# Patient Record
Sex: FEMALE | Race: WHITE | NOT HISPANIC OR LATINO | Employment: OTHER | URBAN - METROPOLITAN AREA
[De-identification: names, ages, dates, MRNs, and addresses within clinical notes are randomized per-mention and may not be internally consistent; named-entity substitution may affect disease eponyms.]

---

## 2017-01-16 PROBLEM — S82.142A TIBIAL PLATEAU FRACTURE, LEFT: Status: ACTIVE | Noted: 2017-01-16

## 2017-01-16 PROBLEM — I48.91 ATRIAL FIBRILLATION: Status: ACTIVE | Noted: 2017-01-16

## 2017-01-18 PROBLEM — G93.40 ACUTE ENCEPHALOPATHY: Status: ACTIVE | Noted: 2017-01-18

## 2017-01-18 PROBLEM — J96.11 CHRONIC RESPIRATORY FAILURE WITH HYPOXIA: Status: ACTIVE | Noted: 2017-01-18

## 2017-03-15 PROBLEM — J18.9 PNEUMONIA OF LEFT LOWER LOBE DUE TO INFECTIOUS ORGANISM: Status: ACTIVE | Noted: 2017-03-15

## 2017-03-15 PROBLEM — R04.2 HEMOPTYSIS: Status: ACTIVE | Noted: 2017-03-15

## 2017-03-15 PROBLEM — D64.9 ANEMIA: Status: ACTIVE | Noted: 2017-03-15

## 2017-03-15 PROBLEM — J18.9 PNEUMONIA: Status: ACTIVE | Noted: 2017-03-15

## 2017-03-15 PROBLEM — R74.01 TRANSAMINITIS: Status: ACTIVE | Noted: 2017-03-15

## 2017-03-15 PROBLEM — T45.511A COUMADIN TOXICITY: Status: ACTIVE | Noted: 2017-03-15

## 2017-06-12 PROBLEM — I48.19 PERSISTENT ATRIAL FIBRILLATION: Status: ACTIVE | Noted: 2017-06-12

## 2017-09-28 PROBLEM — I48.0 PAROXYSMAL ATRIAL FIBRILLATION: Status: ACTIVE | Noted: 2017-09-28

## 2018-02-15 PROBLEM — I35.0 SEVERE AORTIC STENOSIS: Status: ACTIVE | Noted: 2018-02-15

## 2018-03-07 PROBLEM — M54.31 BILATERAL SCIATICA: Status: ACTIVE | Noted: 2018-03-07

## 2018-03-07 PROBLEM — M48.02 CERVICAL SPINAL STENOSIS: Status: ACTIVE | Noted: 2018-03-07

## 2018-03-07 PROBLEM — M54.32 BILATERAL SCIATICA: Status: ACTIVE | Noted: 2018-03-07

## 2018-03-13 ENCOUNTER — OFFICE VISIT (OUTPATIENT)
Dept: VASCULAR SURGERY | Facility: CLINIC | Age: 68
End: 2018-03-13
Payer: MEDICARE

## 2018-03-13 VITALS
HEIGHT: 63 IN | WEIGHT: 192.69 LBS | BODY MASS INDEX: 34.14 KG/M2 | SYSTOLIC BLOOD PRESSURE: 142 MMHG | DIASTOLIC BLOOD PRESSURE: 74 MMHG | HEART RATE: 73 BPM

## 2018-03-13 DIAGNOSIS — Z79.01 WARFARIN ANTICOAGULATION: ICD-10-CM

## 2018-03-13 DIAGNOSIS — I10 ESSENTIAL HYPERTENSION: ICD-10-CM

## 2018-03-13 DIAGNOSIS — I48.19 PERSISTENT ATRIAL FIBRILLATION: ICD-10-CM

## 2018-03-13 DIAGNOSIS — M79.7 FIBROMYALGIA: ICD-10-CM

## 2018-03-13 DIAGNOSIS — I35.0 AORTIC VALVE STENOSIS, NONRHEUMATIC: ICD-10-CM

## 2018-03-13 DIAGNOSIS — I35.0 SEVERE AORTIC STENOSIS: Primary | ICD-10-CM

## 2018-03-13 PROCEDURE — 99999 PR PBB SHADOW E&M-EST. PATIENT-LVL III: CPT | Mod: PBBFAC,,, | Performed by: THORACIC SURGERY (CARDIOTHORACIC VASCULAR SURGERY)

## 2018-03-13 PROCEDURE — 99213 OFFICE O/P EST LOW 20 MIN: CPT | Mod: PBBFAC,PO | Performed by: THORACIC SURGERY (CARDIOTHORACIC VASCULAR SURGERY)

## 2018-03-13 PROCEDURE — 99205 OFFICE O/P NEW HI 60 MIN: CPT | Mod: S$PBB,,, | Performed by: THORACIC SURGERY (CARDIOTHORACIC VASCULAR SURGERY)

## 2018-03-15 PROBLEM — I35.0 AORTIC VALVE STENOSIS, NONRHEUMATIC: Status: ACTIVE | Noted: 2018-03-15

## 2018-03-15 PROBLEM — Z79.01 WARFARIN ANTICOAGULATION: Status: ACTIVE | Noted: 2018-03-15

## 2018-03-15 NOTE — PROGRESS NOTES
CHIEF COMPLAINT:   Chief Complaint   Patient presents with    Aortic Stenosis     Stahls/aortic valve stenosis       REFERRING PROVIDER: Self, Aaareferral    Reason for consult: Evaluation of aortic valve disease    Subjective:     Patient is a 68 y.o. female who has had progressive shortness of breath. Evaluation by cardiology has revealed moderate to severe aortic valve stenosis. She feels that her chronic shortness of breath has worsened. She has a history of COPD. She has not had recent pulmonary function studies. She also has frequent palpitations which she feels are likely related to her atrial fibrillation. She denies syncope.    Patient Active Problem List    Diagnosis Date Noted    Bilateral sciatica 03/07/2018    Cervical spinal stenosis 03/07/2018    Severe aortic stenosis 02/15/2018    Paroxysmal atrial fibrillation 09/28/2017    Persistent atrial fibrillation 06/12/2017    Pneumonia 03/15/2017    Hemoptysis 03/15/2017    Coumadin toxicity 03/15/2017    Pneumonia of left lower lobe due to infectious organism 03/15/2017    Transaminitis 03/15/2017    Anemia 03/15/2017    Acute encephalopathy 01/18/2017    Chronic respiratory failure with hypoxia 01/18/2017    Tibial plateau fracture, left 01/16/2017    Atrial fibrillation 01/16/2017    Bronchitis with bronchospasm 12/07/2016    Asthma 08/26/2016    COPD (chronic obstructive pulmonary disease) 08/26/2016    Acquired hypothyroidism 08/26/2016    Anxiety and depression 08/26/2016    Headache 08/26/2016    Essential hypertension 06/06/2016    Hypothyroidism 06/06/2016    Gastroesophageal reflux disease without esophagitis 06/06/2016    Pulmonary embolism 06/06/2016    Osteoporosis 06/06/2016    Fibromyalgia      Past Medical History:   Diagnosis Date    Allergy     Anticoagulant long-term use     Aortic stenosis     Arthritis     Asthma     Atrial fibrillation     Back pain     Cardiac murmur     COPD (chronic  obstructive pulmonary disease)     Encounter for blood transfusion     Fibromyalgia     General anesthetics causing adverse effect in therapeutic use     GERD (gastroesophageal reflux disease)     [place] (hard of hearing)     Hypertension     Hypothyroidism     Osteoporosis     PUD (peptic ulcer disease)     Pulmonary embolism     Thoracic vertebral collapse     Uses walker       Past Surgical History:   Procedure Laterality Date    APPENDECTOMY      BLADDER SUSPENSION      CARDIAC CATHETERIZATION      COLON SURGERY      COLONOSCOPY      HYSTERECTOMY      LEG SURGERY Left     PELVIC FLOOR REPAIR      TONSILLECTOMY        Medication List with Changes/Refills   New Medications    DOXYCYCLINE (ADOXA) 50 MG TABLET    Take 1 tablet (50 mg total) by mouth 2 (two) times daily.   Current Medications    ALBUTEROL-IPRATROPIUM 2.5MG-0.5MG/3ML (DUO-NEB) 0.5 MG-3 MG(2.5 MG BASE)/3 ML NEBULIZER SOLUTION    Take 3 mLs by nebulization every 6 (six) hours as needed for Wheezing. Rescue    ALENDRONATE (FOSAMAX) 70 MG TABLET    every sunday TAKE 1 TABLET 30 MIN BEFORE BREAKFAST WITH WATER, DON'T LIE DOWN OR EAT FOR 30 MIN.    BUSPIRONE (BUSPAR) 10 MG TABLET    Take 10 mg by mouth 2 (two) times daily.    DILTIAZEM (TIAZAC) 360 MG CS24    Take 1 capsule (360 mg total) by mouth once daily. TAKE 1 CAPSULE DAILY *THANK YOU* *YOEL ARTEAGA*    DULOXETINE (CYMBALTA) 30 MG CAPSULE    TAKE 1 CAPSULE BY MOUTH TWICE DAILY    FLUTICASONE-VILANTEROL (BREO ELLIPTA) 200-25 MCG/DOSE DSDV DISKUS INHALER    Inhale 1 puff into the lungs once daily. Controller    FUROSEMIDE (LASIX) 20 MG TABLET    TAKE 1 TABLET EVERY MORNING *THANK YOU* *YOEL ARTEAGA*    LEVOTHYROXINE (SYNTHROID) 75 MCG TABLET    Take 1 tablet (75 mcg total) by mouth once daily.    LISINOPRIL 10 MG TABLET    Take 1 tablet (10 mg total) by mouth once daily. TAKE 1 TABLET EVERY MORNING THANK YOU YOEL ARTEAGA    OXYCODONE-ACETAMINOPHEN (PERCOCET)  MG PER TABLET    Take 1  tablet by mouth 4 (four) times daily as needed for Pain.    RANITIDINE (ZANTAC) 150 MG TABLET    TAKE 1 TABLET TWICE DAILY *THANK YOU* *GOD BLESS*    ROSUVASTATIN (CRESTOR) 10 MG TABLET    TAKE ONE TABLET DAILY AT BEDTIME *THANK YOU* *GOD BLESS*    TIZANIDINE (ZANAFLEX) 4 MG TABLET    TAKE (1) TABLET EVERY EIGHT HOURS AS NEEDED FOR MUSCLE SPASMS    VENTOLIN HFA 90 MCG/ACTUATION INHALER    INHALE 2 PUFFS EVERY 4 HOURS AS NEEDED *THANK YOU* *GOD BLESS*    VITAMIN D2 50,000 UNIT CAPSULE    TAKE 1 CAPSULE once A month    WARFARIN (COUMADIN) 10 MG TABLET    TAKE ONE TABLET DAILY 5 TIMES A WEEK AND TAKE 1 & 1/2 TABLET 2 DAYS *THANK YOU* *GOD BLESS*   Discontinued Medications    DULOXETINE (CYMBALTA) 30 MG CAPSULE    Take 30 mg by mouth 2 (two) times daily.    DULOXETINE (CYMBALTA) 30 MG CAPSULE    TAKE 1 CAPSULE BY MOUTH TWICE DAILY    FLUTICASONE (FLONASE) 50 MCG/ACTUATION NASAL SPRAY    1 spray by Each Nare route daily as needed.     FUROSEMIDE (LASIX) 20 MG TABLET    Take 2 tablets (40 mg total) by mouth once daily.    MONTELUKAST (SINGULAIR) 10 MG TABLET    Take 1 tablet (10 mg total) by mouth every morning.    RANITIDINE (ZANTAC) 150 MG TABLET    Take 1 tablet (150 mg total) by mouth 2 (two) times daily. TAKE 1 TABLET TWICE DAILY *THANK YOU* *YOEL ANDRADESS*    RANITIDINE (ZANTAC) 150 MG TABLET    TAKE 1 TABLET TWICE DAILY *THANK YOU* *YOEL ANDRADESS*    WARFARIN (COUMADIN) 10 MG TABLET    Take 7 mg by mouth once daily.      Review of patient's allergies indicates:   Allergen Reactions    Keflex [cephalexin]     Penicillins     Sulfa (sulfonamide antibiotics)       Social History   Substance Use Topics    Smoking status: Former Smoker     Packs/day: 1.00     Years: 20.00     Types: Cigarettes     Quit date: 1982    Smokeless tobacco: Never Used    Alcohol use No      Family History   Problem Relation Age of Onset    Heart disease Mother     Diabetes Mother     Diabetes Father     Hypertension Father     Arthritis  "Sister     Myasthenia gravis Sister     Heart disease Brother       Review of Systems  Gen.: No fevers, chills, night sweats. She has had weight change and progressive fatigue.  HEENT: Chronic visual field changes. Chronic diminished hearing. No hoarseness.  Neck: No complaints.  Cardiac: Positive palpitations and worsening shortness of breath with exertion. No specific chest pain.  Respiratory: Chronic shortness of breath which is worsening currently.  GI: She has swallowing problems intermittently. Positive constipation.  : No complaints.  Musculoskeletal: Chronic weakness and pains in legs especially the left knee which will require replacement at some time soon.  Vascular: No claudication.  Neurologic: No lateralizing complaints.  Hematologic: Easy bleeding with current use of warfarin for her atrial fibrillation (chronic).    Objective: Physical exam      Vitals:    03/13/18 1342   BP: (!) 142/74   Pulse: 73   Weight: 87.4 kg (192 lb 10.9 oz)   Height: 5' 3" (1.6 m)   PainSc:   6   PainLoc: Knee       Gen.: No obvious distress.  HEENT: Normocephalic, atraumatic. There is good facial symmetry.  Neck: Trachea is midline. There are no carotid bruits.  Chest: No chest wall abnormalities.  Lungs: Clear bilaterally with slightly prolonged expiratory phase.  Heart: Irregularly irregular with normal rate.  Abdomen: Organomegaly.  Extremities: Trace edema at the ankles.  Skin: No rash.  Neurologic: No lateralizing motor abnormalities.  Vascular: 2+ radial pulses bilaterally.    Data Review:   Lab Results   Component Value Date    WBC 9.92 11/28/2017    HGB 13.4 11/28/2017    HCT 43.8 11/28/2017    MCV 94 11/28/2017     11/28/2017   ,   BMP   Lab Results   Component Value Date     11/28/2017    K 4.0 11/28/2017    CL 99 11/28/2017    CO2 33 (H) 11/28/2017    BUN 26 (H) 11/28/2017    CREATININE 1.02 11/28/2017    CALCIUM 9.4 11/28/2017    ANIONGAP 12 11/28/2017    ESTGFRAFRICA >60 11/28/2017    EGFRNONAA " 57 (A) 11/28/2017   ,   CMP  Sodium   Date Value Ref Range Status   11/28/2017 144 136 - 145 mmol/L Final     Potassium   Date Value Ref Range Status   11/28/2017 4.0 3.5 - 5.1 mmol/L Final     Chloride   Date Value Ref Range Status   11/28/2017 99 95 - 110 mmol/L Final     CO2   Date Value Ref Range Status   11/28/2017 33 (H) 22 - 31 mmol/L Final     Glucose   Date Value Ref Range Status   11/28/2017 83 70 - 110 mg/dL Final     Comment:     The ADA recommends the following guidelines for fasting glucose:  Normal:       less than 100 mg/dL  Prediabetes:  100 mg/dL to 125 mg/dL  Diabetes:     126 mg/dL or higher       BUN, Bld   Date Value Ref Range Status   11/28/2017 26 (H) 7 - 18 mg/dL Final     Creatinine   Date Value Ref Range Status   11/28/2017 1.02 0.50 - 1.40 mg/dL Final     Calcium   Date Value Ref Range Status   11/28/2017 9.4 8.4 - 10.2 mg/dL Final     Total Protein   Date Value Ref Range Status   09/28/2017 7.7 6.0 - 8.4 g/dL Final     Albumin   Date Value Ref Range Status   09/28/2017 4.1 3.5 - 5.2 g/dL Final     Total Bilirubin   Date Value Ref Range Status   09/28/2017 0.4 0.2 - 1.3 mg/dL Final     Alkaline Phosphatase   Date Value Ref Range Status   09/28/2017 86 38 - 145 U/L Final     AST ([place])   Date Value Ref Range Status   02/04/2016 30 14 - 36 U/L Final     AST   Date Value Ref Range Status   09/28/2017 22 14 - 36 U/L Final     ALT   Date Value Ref Range Status   09/28/2017 27 10 - 44 U/L Final     Anion Gap   Date Value Ref Range Status   11/28/2017 12 8 - 16 mmol/L Final     eGFR if    Date Value Ref Range Status   11/28/2017 >60 >60 mL/min/1.73 m^2 Final     eGFR if non    Date Value Ref Range Status   11/28/2017 57 (A) >60 mL/min/1.73 m^2 Final     Comment:     Calculation used to obtain the estimated glomerular filtration  rate (eGFR) is the CKD-EPI equation.      ,   Lab Results   Component Value Date    INR 4.9 (A) 03/07/2018    INR 1.6  02/15/2018    INR 1.8 (A) 01/05/2018       ECG:No results found for this visit on 03/13/18.    2D ECHO:   Date of Procedure: 01/08/2018  TEST DESCRIPTION   Aorta: The aortic root is normal in size, measuring 2.6 cm at sinotubular junction. The proximal ascending aorta is normal in size, measuring 2.4 cm across.   Left Atrium: The left atrial volume index is severely enlarged, measuring 58.71 cc/m2.   Left Ventricle: The left ventricle is normal in size, with an end-diastolic diameter of 4.7 cm, and an end-systolic diameter of 3.4 cm. LV wall thickness is normal, with the septum and the posterior wall each measuring 1.1 cm across. Relative wall   thickness was increased at 0.47, and the LV mass index was increased at 115.8 g/m2 consistent with concentric left ventricular hypertrophy. There are no regional wall motion abnormalities. Left ventricular systolic function appears normal. Visually   estimated ejection fraction is 60-65%. The LV Doppler derived stroke volume equals 91.0 ccs.   Diastolic indices: E wave velocity 1.5 m/s, E/A ratio 3.8,  msec., E/e' ratio(avg) 11. There is pseudonormalization of mitral inflow pattern consistent with significant diastolic dysfunction.   Right Atrium: The right atrium is normal in size, measuring 6.0 cm in length and 4.6 cm in width in the apical view.   Right Ventricle: The right ventricle is normal in size measuring 3.6 cm at the base in the apical right ventricle-focused view. Global right ventricular systolic function appears normal. The estimated RV systolic pressure is 51 mmHg.   Aortic Valve:  The aortic valve is markedly sclerotic. The peak velocity obtained across the aortic valve is 4.09 m/s, which translates to a peak gradient of 67 mmHg. The mean gradient is 38 mmHg. Using a left ventricular outflow tract diameter of 2.0   cm, a left ventricular outflow tract velocity time integral of 29 cm, and a peak instantaneous transvalvular velocity time integral of 101  cm, the calculated aortic valve area is 0.9 cm2(AVAi is 0.47 cm2/m2), consistent with severe aortic stenosis.   Mitral Valve:  There is mild mitral regurgitation.   Tricuspid Valve:  There is trivial tricuspid regurgitation.   Pulmonary Valve:  The pulmonic valve is not well seen.   IVC: IVC is enlarged but collapses > 50% with a sniff, suggesting intermediate right atrial pressure of 8 mmHg.   Intracavitary: There is no evidence of pericardial effusion, intracavity mass, thrombi, or vegetation.   CONCLUSIONS     1 - Normal left ventricular systolic function (EF 60-65%).     2 - Severe left atrial enlargement.     3 - Impaired LV relaxation, elevated LAP (grade 2 diastolic dysfunction).     4 - Normal right ventricular systolic function .     5 - The estimated RV systolic pressure is 51 mmHg.     6 - Severe Aortic stenosis ((COLLETTE 0.90 cm2, AVAi 0.47 cm2/m2, peak aortic jet velocity 4.1 m/s,MG 38 mmHg, EF 60%,SVi 48 mL/m2).     7 - Intermediate central venous pressure.     8 - Trivial tricuspid regurgitation.     9 - Mild mitral regurgitation.   This document has been electronically    SIGNED BY: Vance Ren MD On: 01/09/2018 13:09    Chest X-Ray: No results found for this visit on 03/13/18.No results found for this visit on 03/13/18.        CARDIAC CATH:   PROCEDURES PERFORMED:  --  Left coronary angiography.  --  Right coronary angiography.  --  Left heart catheterization with ventriculography.  INDICATIONS: Dyspnea on exertion  COMPLICATIONS: No complications occurred during the cath lab visit.  PROCEDURE: The risks and alternatives of the procedures and conscious sedation  were explained to the patient and informed consent was obtained. The patient  was brought to the cath lab and placed on the table. The planned puncture sites  were prepped and draped in the usual sterile fashion.    --  Right radial artery access. The puncture site was infiltrated with 20 ml 1  % lidocaine. The vessel was accessed using the  modified Seldinger technique, a  wire was threaded into the vessel, and a 5/6 Slender sheath was advanced over  the wire into the vessel.    --  Left coronary artery angiography. A 5fr Burt Radial catheter was advanced  to the aorta and positioned in the vessel ostium under fluoroscopic guidance.  Angiography was performed in multiple projections using power-assisted (ACIST)  injection of contrast.    --  Right coronary artery angiography. A 5fr Burt Radial catheter was advanced  to the aorta and positioned in the vessel ostium under fluoroscopic guidance.  Angiography was performed in multiple projections using power-assisted (ACIST)  injection of contrast.    --  Left heart catheterization. A 5fr TIG 4.0 catheter was advanced to the  ascending aorta across the aortic valve and left ventricular pressure was  recorded. Ventriculography was performed using power assisted (ACIST) injection  of contrast agent. As the catheter was withdrawn into the ascending aorta,  pressures were continuously recorded.    PROCEDURE COMPLETION: TIMING: Test started at 12:18. Test concluded at 12:47.  RADIATION EXPOSURE: Fluoroscopy time: 6.12 min.    MEDICATIONS GIVEN: Fentanyl, 50 mcg, IV, at 12:20. Verapamil (Isoptin, Calan,  Covera), 2.5 mg, IA, at 12:24. Nitroglycerin, 200 mcg, IA, at 12:24.  Nitroglycerin, 300 mcg, IA, at 12:38. Heparin, 3000 units, IA, last dose at  12:24. Versed, 2 mg, IV, at 12:20.  CONTRAST GIVEN: Omnipaque 70 ml. Omnipaque 17 ml.  VENTRICLES:  --  Global left ventricular function was normal.  --  LVEDP 25-30 mmHg. The peak to peak gradient across the aortic valve is  approximately 55 to 60 mmHg  CORONARY CIRCULATION:  --  Left main: The left main bifurcates into the left anterior descending and  left circumflex artery. --  LAD: At the mid portion of the LAD with the first  branch of the diagonal there is approximately 30-40% lesion. It appears to be  heavily calcified, otherwise nonobstructive disease is  noted throughout that  vessel.  --  Circumflex: Nonobstructive disease is noted throughout  --  RCA: The RCA is a dominant artery with an anterior takeoff and evidence of  minimal coronary artery disease is seen.    CONCLUSIONS:  NOTATIONS:  Nonobstructive coronary artery disease  Normal LVEF with elevated LVEDP and suspect likely severe aortic stenosis and  we will reassess with a 2-D echocardiogram as an outpatient  Prepared and signed by  Golden Zeng MD  Signed 09/29/2017 09:38:42     Assessment:     1. Moderate aortic valve stenosis.  2. Persistent atrial fibrillation.  3. Progressive exertional dyspnea.  4. History of COPD.  5. Chronic pain syndrome.    Plan:     Patient should be considered for aortic valve replacement with Maze procedure and resection of left atrial appendage.  I would like her to undergo pulmonary function studies prior to final consideration of surgery.  Next consideration for surgical intervention would be which type of valve to use; tissue or mechanical.  I would perform a Maze procedure and resect the left atrial appendage during an operative procedure which could potentially eliminate her need for anticoagulation with warfarin, but this is not certain. Should she remain on long-term anticoagulation, then consideration for mechanical valve could be entertained with its greater durability than a tissue valve. Otherwise, at age 68 the tissue valve would be the better consideration. I believe that either would be appropriate with this patient.

## 2018-03-16 DIAGNOSIS — J44.89 COPD WITH CHRONIC BRONCHITIS: Primary | ICD-10-CM

## 2018-04-25 PROBLEM — M47.14 THORACIC SPONDYLOSIS WITH MYELOPATHY: Status: ACTIVE | Noted: 2018-04-25

## 2018-05-25 ENCOUNTER — TELEPHONE (OUTPATIENT)
Dept: VASCULAR SURGERY | Facility: CLINIC | Age: 68
End: 2018-05-25

## 2018-05-25 RX ORDER — TRAMADOL HYDROCHLORIDE 50 MG/1
TABLET ORAL
COMMUNITY
Start: 2018-05-21 | End: 2018-06-13

## 2018-05-25 RX ORDER — MONTELUKAST SODIUM 10 MG/1
TABLET ORAL
Refills: 3 | COMMUNITY
Start: 2018-05-08

## 2018-05-25 RX ORDER — PREGABALIN 50 MG/1
50 CAPSULE ORAL NIGHTLY
Status: ON HOLD | COMMUNITY
Start: 2018-05-21 | End: 2018-06-20 | Stop reason: HOSPADM

## 2018-05-25 NOTE — TELEPHONE ENCOUNTER
----- Message from Nick Zhu sent at 5/25/2018  1:49 PM CDT -----  Contact: self   Patient want to speak with a nurse to discuss open heart surgery, please call back at [phone] (home)

## 2018-06-05 DIAGNOSIS — I35.0 NONRHEUMATIC AORTIC VALVE STENOSIS: Primary | ICD-10-CM

## 2018-06-05 DIAGNOSIS — I48.19 PERSISTENT ATRIAL FIBRILLATION: ICD-10-CM

## 2018-06-06 NOTE — TELEPHONE ENCOUNTER
Patients daughter Emma notified of  Pre-op 6/13 1:00P  Surgery 6/14 TF  Left message at Dr Zeng office to advise patient about instructions on stopping Coumadin and bridging with Lovonox

## 2018-06-12 DIAGNOSIS — I35.0 AORTIC VALVE STENOSIS, ETIOLOGY OF CARDIAC VALVE DISEASE UNSPECIFIED: Primary | ICD-10-CM

## 2018-06-12 DIAGNOSIS — I35.0 AORTIC VALVE STENOSIS: ICD-10-CM

## 2018-06-12 RX ORDER — HYDROCODONE BITARTRATE AND ACETAMINOPHEN 500; 5 MG/1; MG/1
TABLET ORAL
Status: CANCELLED | OUTPATIENT
Start: 2018-06-12

## 2018-06-12 RX ORDER — SODIUM CHLORIDE 9 MG/ML
INJECTION, SOLUTION INTRAVENOUS CONTINUOUS
Status: CANCELLED | OUTPATIENT
Start: 2018-06-12

## 2018-06-14 PROBLEM — I35.0 AORTIC VALVE STENOSIS: Status: ACTIVE | Noted: 2018-06-14

## 2018-06-18 PROBLEM — I70.0 THORACIC AORTA ATHEROSCLEROSIS: Status: ACTIVE | Noted: 2018-06-18

## 2018-06-20 ENCOUNTER — TELEPHONE (OUTPATIENT)
Dept: VASCULAR SURGERY | Facility: CLINIC | Age: 68
End: 2018-06-20

## 2018-06-20 PROBLEM — R09.02 HYPOXEMIA REQUIRING SUPPLEMENTAL OXYGEN: Status: ACTIVE | Noted: 2018-06-20

## 2018-06-20 PROBLEM — Z99.81 HYPOXEMIA REQUIRING SUPPLEMENTAL OXYGEN: Status: ACTIVE | Noted: 2018-06-20

## 2018-06-20 NOTE — TELEPHONE ENCOUNTER
----- Message from Le Hoskins sent at 6/20/2018  3:46 PM CDT -----  Contact: Jag Rm calling states pt is being discharged today and needs a 4 week post-op/hospital follow up appointment in [place],[facility]. And they would like to schedule call  Emma Mccall (Daughter)[phone]

## 2018-07-03 ENCOUNTER — OFFICE VISIT (OUTPATIENT)
Dept: VASCULAR SURGERY | Facility: CLINIC | Age: 68
End: 2018-07-03
Payer: MEDICARE

## 2018-07-03 VITALS
WEIGHT: 193.5 LBS | BODY MASS INDEX: 35.61 KG/M2 | HEIGHT: 62 IN | HEART RATE: 62 BPM | SYSTOLIC BLOOD PRESSURE: 123 MMHG | DIASTOLIC BLOOD PRESSURE: 64 MMHG

## 2018-07-03 DIAGNOSIS — I70.0 THORACIC AORTA ATHEROSCLEROSIS: Primary | ICD-10-CM

## 2018-07-03 DIAGNOSIS — I35.0 SEVERE AORTIC STENOSIS: ICD-10-CM

## 2018-07-03 DIAGNOSIS — I48.19 PERSISTENT ATRIAL FIBRILLATION: ICD-10-CM

## 2018-07-03 PROCEDURE — 99213 OFFICE O/P EST LOW 20 MIN: CPT | Mod: PBBFAC,PO | Performed by: THORACIC SURGERY (CARDIOTHORACIC VASCULAR SURGERY)

## 2018-07-03 PROCEDURE — 99999 PR PBB SHADOW E&M-EST. PATIENT-LVL III: CPT | Mod: PBBFAC,,, | Performed by: THORACIC SURGERY (CARDIOTHORACIC VASCULAR SURGERY)

## 2018-07-03 PROCEDURE — 99024 POSTOP FOLLOW-UP VISIT: CPT | Mod: POP,,, | Performed by: THORACIC SURGERY (CARDIOTHORACIC VASCULAR SURGERY)

## 2018-07-03 NOTE — PROGRESS NOTES
CLINIC NOTE    HISTORY OF PRESENT ILLNESS:  Ms. Medina is a 68-year-old white female with   progressive exertional dyspnea, atrial fibrillation and aortic valve stenosis.    On 06/14/2018, she underwent surgical exploration with plans for a full maze   procedure and aortic valve replacement.  This could not be performed because her   ascending aorta was completely calcified from the aortic valve through the   transverse arch.  Isolation of pulmonary veins with a [employer]tronic Cardioblate   device was performed as much as possible.  The left atrial appendage was not   resected.  She returns for followup.  She has had some weight loss.  She has   chronic pain, but is tolerating surgical recovery.    MEDICATIONS AND ALLERGIES:  Reviewed.    PHYSICAL EXAMINATION:  VITAL SIGNS:  Blood pressure 123/64, heart rate 62, weight 193 pounds.  CHEST:  Sternum is stable.  Sternotomy incisions are healed.  There are sutures   in the mediastinal drain sites.  I removed them on this visit.  LUNGS:  Moving air bilaterally with slightly coarse breath sounds as per her   baseline.    IMPRESSION:  1.  Calcified ascending aorta, which will not allow aortic valve replacement for   aortic valve stenosis.  2.  Chronic atrial fibrillation.    PLAN:  Consideration for evaluation of TAVR.  Percocet 10/325 is refilled. #40.    The patient will follow with me as needed.  She will refrain from strenuous   activities for eight more weeks.      MARLO/CAT  dd: 07/03/2018 10:43:26 (CDT)  td: 07/04/2018 04:14:30 (CDT)  Doc ID   #[id number]  Job ID #[id number]    CC: Golden Zeng III, M.D.

## 2018-07-13 ENCOUNTER — TELEPHONE (OUTPATIENT)
Dept: VASCULAR SURGERY | Facility: CLINIC | Age: 68
End: 2018-07-13

## 2018-07-13 PROBLEM — I25.10 CORONARY ARTERY DISEASE DUE TO CALCIFIED CORONARY LESION: Status: ACTIVE | Noted: 2018-07-13

## 2018-07-13 PROBLEM — I25.84 CORONARY ARTERY DISEASE DUE TO CALCIFIED CORONARY LESION: Status: ACTIVE | Noted: 2018-07-13

## 2018-07-13 PROBLEM — I70.0 THORACIC AORTA ATHEROSCLEROSIS: Status: RESOLVED | Noted: 2018-06-18 | Resolved: 2018-07-13

## 2018-07-13 NOTE — TELEPHONE ENCOUNTER
----- Message from Nick Zhu sent at 7/13/2018 12:28 PM CDT -----  Contact: self   Patient need to speak with a nurse regarding releasing her to start back seeing her pain management Dr again. Please call back at [phone]

## 2018-07-13 NOTE — TELEPHONE ENCOUNTER
----- Message from Angelique Jones sent at 7/13/2018  1:01 PM CDT -----  Contact: Laly   She is calling back to give the phone number for he pain management doctor. Dr. Alisha Fox. [phone]. Please call her back [phone]

## 2018-07-18 ENCOUNTER — TELEPHONE (OUTPATIENT)
Dept: VASCULAR SURGERY | Facility: CLINIC | Age: 68
End: 2018-07-18

## 2018-07-18 NOTE — TELEPHONE ENCOUNTER
Richard was asked to call Dr Hamilton's office for authorization and notify what she has been doing

## 2018-07-18 NOTE — TELEPHONE ENCOUNTER
----- Message from Uzma Metcalf sent at 7/18/2018  9:46 AM CDT -----  Type:  Pharmacy Calling to Clarify an RX    Name of Caller: pt  Pharmacy Name:  [facility]  [phone] Prescription Name:   Mobile  ovalarie What do they need to clarify?:  elpidio  Best Call Back Number:  [phone]  [phone] additional Information:   Please call  For details

## 2018-07-18 NOTE — TELEPHONE ENCOUNTER
----- Message from Dean Osman sent at 7/18/2018 10:53 AM CDT -----  Type:  Pharmacy Calling to Clarify an RX    Name of Caller:  Richard  Pharmacy Name:  [facility]  Prescription Name:  lisinopril (PRINIVIL,ZESTRIL) 2.5 MG tablet   What do they need to clarify?:  Prescription says 1xday - patient said she has been taking 4 or 5 a day due to her AFIB  Best Call Back Number:  [phone]  Additional Information:  Please call to advise as soon as possible.